# Patient Record
(demographics unavailable — no encounter records)

---

## 2024-10-14 NOTE — HISTORY OF PRESENT ILLNESS
[de-identified] : 52 yo  male with h/o HLD presents for evaluation of a right groin bulge. He is very active, recently lost 50lbs. He denies any nausea or vomiting. Denies h/o hernia repair in the past.  He states he has some upcoming plans but would like to schedule surgery soon.

## 2024-10-14 NOTE — PHYSICAL EXAM
[Respiratory Effort] : normal respiratory effort [Alert] : alert [Oriented to Person] : oriented to person [Oriented to Place] : oriented to place [Oriented to Time] : oriented to time [Calm] : calm [JVD] : no jugular venous distention  [Abdominal Masses] : No abdominal masses [Abdomen Tenderness] : ~T ~M No abdominal tenderness [de-identified] : YOLANDA HORNER NAD [de-identified] : EOMI [de-identified] : soft NT, ND, obese  + reducible RIH and possible smaller LIH

## 2024-12-16 NOTE — ASSESSMENT
[FreeTextEntry1] : Patient is doing well, has some scrotal swelling which is resolving,    Incisions are c/d/i and healing well. + hematoma in right inguinal canal    f/u 1 month